# Patient Record
Sex: MALE | Race: WHITE
[De-identification: names, ages, dates, MRNs, and addresses within clinical notes are randomized per-mention and may not be internally consistent; named-entity substitution may affect disease eponyms.]

---

## 2018-03-29 ENCOUNTER — HOSPITAL ENCOUNTER (OUTPATIENT)
Dept: HOSPITAL 75 - RAD | Age: 33
End: 2018-03-29
Payer: COMMERCIAL

## 2018-03-29 DIAGNOSIS — Z95.828: ICD-10-CM

## 2018-03-29 DIAGNOSIS — Z79.2: ICD-10-CM

## 2018-03-29 DIAGNOSIS — E84.0: Primary | ICD-10-CM

## 2018-03-29 DIAGNOSIS — T82.598A: ICD-10-CM

## 2018-03-29 PROCEDURE — 71046 X-RAY EXAM CHEST 2 VIEWS: CPT

## 2018-03-29 NOTE — DIAGNOSTIC IMAGING REPORT
INDICATION: Cystic fibrosis.



TIME OF EXAM: 3:24 p.m.



No prior studies are available for comparison.



Right chest wall port has tip overlying the SVC. No pneumothorax

is identified. Chronic appearing parenchymal changes throughout

both lungs are noted consistent with patient's diagnosis of

cystic fibrosis. No effusion is seen. The heart size is normal.



IMPRESSION: Right-sided port placement. No pneumothorax is seen.



Dictated by: 



  Dictated on workstation # [id number]